# Patient Record
Sex: FEMALE
[De-identification: names, ages, dates, MRNs, and addresses within clinical notes are randomized per-mention and may not be internally consistent; named-entity substitution may affect disease eponyms.]

---
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
## 2023-11-10 ENCOUNTER — OFFICE (OUTPATIENT)
Dept: URBAN - METROPOLITAN AREA CLINIC 70 | Facility: CLINIC | Age: 18
Setting detail: OPHTHALMOLOGY
End: 2023-11-10
Payer: COMMERCIAL

## 2023-11-10 DIAGNOSIS — H55.02: ICD-10-CM

## 2023-11-10 DIAGNOSIS — H40.013: ICD-10-CM

## 2023-11-10 DIAGNOSIS — H50.43: ICD-10-CM

## 2023-11-10 DIAGNOSIS — D31.00: ICD-10-CM

## 2023-11-10 PROCEDURE — 92014 COMPRE OPH EXAM EST PT 1/>: CPT | Performed by: STUDENT IN AN ORGANIZED HEALTH CARE EDUCATION/TRAINING PROGRAM

## 2023-11-10 PROCEDURE — 92250 FUNDUS PHOTOGRAPHY W/I&R: CPT | Performed by: STUDENT IN AN ORGANIZED HEALTH CARE EDUCATION/TRAINING PROGRAM

## 2023-11-10 ASSESSMENT — REFRACTION_MANIFEST
OS_AXIS: 005
OD_VA1: 20/20
OD_SPHERE: +4.25
OD_CYLINDER: -3.25
OS_SPHERE: +4.75
OS_CYLINDER: -2.25
OD_SPHERE: +5.25
OS_SPHERE: +5.00
OS_VA1: 20/30-
OD_AXIS: 170
OS_AXIS: 005
OS_AXIS: 005
OD_AXIS: 175
OS_VA1: 20/50+1
OS_CYLINDER: -2.25
OD_VA1: 20/25-2
OD_CYLINDER: -3.25
OD_AXIS: 170
OD_SPHERE: +5.00
OS_CYLINDER: -2.25
OD_CYLINDER: -3.00
OS_SPHERE: +4.00

## 2023-11-10 ASSESSMENT — SPHEQUIV_DERIVED
OD_SPHEQUIV: 3.375
OD_SPHEQUIV: 3.75
OS_SPHEQUIV: 3.625
OD_SPHEQUIV: 2.625
OS_SPHEQUIV: 3.875
OS_SPHEQUIV: 2.75
OD_SPHEQUIV: 2.125
OS_SPHEQUIV: 2.875

## 2023-11-10 ASSESSMENT — REFRACTION_CURRENTRX
OS_CYLINDER: -2.25
OD_SPHERE: +5.25
OS_VPRISM_DIRECTION: SV
OS_SPHERE: +5.25
OS_AXIS: 003
OD_OVR_VA: 20/
OS_CYLINDER: -2.25
OD_VPRISM_DIRECTION: SV
OS_AXIS: 171
OD_AXIS: 170
OS_OVR_VA: 20/
OD_CYLINDER: -3.25
OS_OVR_VA: 20/
OD_AXIS: 171
OD_SPHERE: +5.25
OD_OVR_VA: 20/
OS_SPHERE: +5.00
OD_CYLINDER: -3.25

## 2023-11-10 ASSESSMENT — REFRACTION_AUTOREFRACTION
OS_AXIS: 004
OD_CYLINDER: -3.25
OS_SPHERE: +4.00
OD_SPHERE: +3.75
OS_CYLINDER: -2.50
OD_AXIS: 167

## 2023-11-10 ASSESSMENT — CONFRONTATIONAL VISUAL FIELD TEST (CVF)
OD_FINDINGS: FULL
OS_FINDINGS: FULL

## 2025-05-02 ENCOUNTER — OFFICE (OUTPATIENT)
Dept: URBAN - METROPOLITAN AREA CLINIC 70 | Facility: CLINIC | Age: 20
Setting detail: OPHTHALMOLOGY
End: 2025-05-02
Payer: COMMERCIAL

## 2025-05-02 DIAGNOSIS — H50.43: ICD-10-CM

## 2025-05-02 DIAGNOSIS — D31.00: ICD-10-CM

## 2025-05-02 DIAGNOSIS — H40.013: ICD-10-CM

## 2025-05-02 PROCEDURE — 92014 COMPRE OPH EXAM EST PT 1/>: CPT | Performed by: STUDENT IN AN ORGANIZED HEALTH CARE EDUCATION/TRAINING PROGRAM

## 2025-05-02 PROCEDURE — 92250 FUNDUS PHOTOGRAPHY W/I&R: CPT | Performed by: STUDENT IN AN ORGANIZED HEALTH CARE EDUCATION/TRAINING PROGRAM

## 2025-05-02 ASSESSMENT — REFRACTION_CURRENTRX
OS_VPRISM_DIRECTION: SV
OD_SPHERE: +5.00
OD_OVR_VA: 20/
OD_OVR_VA: 20/
OS_OVR_VA: 20/
OS_CYLINDER: -2.25
OD_AXIS: 170
OS_SPHERE: +4.75
OS_AXIS: 005
OD_CYLINDER: -3.25
OD_VPRISM_DIRECTION: SV
OS_OVR_VA: 20/

## 2025-05-02 ASSESSMENT — VISUAL ACUITY
OD_BCVA: 20/25
OS_BCVA: 20/20

## 2025-05-02 ASSESSMENT — CONFRONTATIONAL VISUAL FIELD TEST (CVF)
OS_FINDINGS: FULL
OD_FINDINGS: FULL

## 2025-05-02 ASSESSMENT — KERATOMETRY
OS_K2POWER_DIOPTERS: 43.25
OD_K2POWER_DIOPTERS: 44.00
OS_K1POWER_DIOPTERS: 40.50
OD_K1POWER_DIOPTERS: 40.50
OD_AXISANGLE_DEGREES: 081
OS_AXISANGLE_DEGREES: 092

## 2025-05-02 ASSESSMENT — REFRACTION_AUTOREFRACTION
OD_AXIS: 168
OD_SPHERE: +4.50
OS_CYLINDER: -2.25
OD_CYLINDER: -3.00
OS_AXIS: 006
OS_SPHERE: +4.75